# Patient Record
Sex: FEMALE | NOT HISPANIC OR LATINO | URBAN - METROPOLITAN AREA
[De-identification: names, ages, dates, MRNs, and addresses within clinical notes are randomized per-mention and may not be internally consistent; named-entity substitution may affect disease eponyms.]

---

## 2020-02-28 ENCOUNTER — IMPORTED ENCOUNTER (OUTPATIENT)
Dept: URBAN - METROPOLITAN AREA CLINIC 1 | Facility: CLINIC | Age: 55
End: 2020-02-28

## 2020-02-28 PROBLEM — H25.813: Noted: 2020-02-28

## 2020-02-28 PROBLEM — H04.123: Noted: 2020-02-28

## 2020-02-28 PROBLEM — H16.143: Noted: 2020-02-28

## 2020-02-28 PROCEDURE — 92002 INTRM OPH EXAM NEW PATIENT: CPT

## 2020-02-28 PROCEDURE — 92015 DETERMINE REFRACTIVE STATE: CPT

## 2020-02-28 NOTE — PATIENT DISCUSSION
1.  STANLEY w/ PEK OU- Recommend AT's Q2hr OU (sample of Refresh pan given)2. Cataract OU: Observe for now without intervention. The patient was advised to contact us if any change or worsening of visionReturn for an appointment in 1 month 10/K check/ tear lab with Dr. En Allen.

## 2020-08-27 ENCOUNTER — IMPORTED ENCOUNTER (OUTPATIENT)
Dept: URBAN - METROPOLITAN AREA CLINIC 1 | Facility: CLINIC | Age: 55
End: 2020-08-27

## 2020-08-27 PROBLEM — G91.9: Noted: 2020-08-27

## 2020-08-27 PROBLEM — H02.834: Noted: 2020-08-27

## 2020-08-27 PROBLEM — H16.143: Noted: 2020-08-27

## 2020-08-27 PROBLEM — H04.123: Noted: 2020-08-27

## 2020-08-27 PROBLEM — H02.831: Noted: 2020-08-27

## 2020-08-27 PROCEDURE — 92014 COMPRE OPH EXAM EST PT 1/>: CPT

## 2020-08-27 PROCEDURE — 92133 CPTRZD OPH DX IMG PST SGM ON: CPT

## 2020-08-27 PROCEDURE — 83861 MICROFLUID ANALY TEARS: CPT

## 2020-08-27 NOTE — PATIENT DISCUSSION
1.  STANLEY w/ PEK OU -- Likely responsible for visual complaints.  Recommend AT's Q2hr OU (sample of Refresh pan given)

## 2020-08-27 NOTE — PATIENT DISCUSSION
1.  H/o Hydrocephalus w/  Shunts - No Optic Nerve Edema. Fundus essentially normal with exception of moderate cupping of optic nerve which appears physiologic. 2.  COAG Suspect OU (CD 0.75 OU) - secondary to optic nerve cupping. OCT with minimal RNFL thinning. Patient is considered Low Risk will continue to observe. 3.  STANLEY w/ PEK OU - Likely responsible for visual complaints. Recommend AT's Q2hr OU (sample of Refresh pan given). Consider Restasis vs. plugs without improvement4. Dermatochalasis OU UL's  - Follow with no intervention at this time. Return for an appointment in 6 months 10 (consider Restasis vs. plugs) with Dr. Serene Gonsalez.

## 2021-02-16 ENCOUNTER — IMPORTED ENCOUNTER (OUTPATIENT)
Dept: URBAN - METROPOLITAN AREA CLINIC 1 | Facility: CLINIC | Age: 56
End: 2021-02-16

## 2021-02-16 PROCEDURE — 99213 OFFICE O/P EST LOW 20 MIN: CPT

## 2021-02-16 NOTE — PATIENT DISCUSSION
1.  Hydrocephalus w/  Shunts - No Optic Nerve Edema. Intraocular pressure low nomal. Fundus essentially normal with exception of moderate cupping of optic nerve which appears physiologic. 2.  COAG Suspect OU (CD 0.75 OU) - secondary to optic nerve cupping. Patient is considered Low Risk will continue to observe. 3.  STANLEY w/ PEK OU - Likely responsible for visual complaints. Recommend AT's Q2hr OU (sample of Refresh pan given). Consider Restasis vs. plugs without improvement4. Dermatochalasis OU UL's  - Follow with no intervention at this time. DMV Visual Field performed and  DMV Form completed today; see attachmentsReturn for an appointment in 6 months 30/OCT with Dr. Chau Murdock.

## 2022-04-02 ASSESSMENT — VISUAL ACUITY
OD_CC: J5
OD_CC: J1+
OD_CC: J1+
OS_CC: 20/40
OS_CC: 20/40
OD_SC: 20/25
OS_CC: J1+
OS_CC: J5
OS_SC: 20/20
OS_SC: 20/20-1
OD_SC: 20/20
OS_SC: 20/20
OS_CC: J1+
OD_CC: 20/80
OD_CC: 20/80
OD_SC: 20/20-1

## 2022-04-02 ASSESSMENT — TONOMETRY
OD_IOP_MMHG: 13
OD_IOP_MMHG: 13
OS_IOP_MMHG: 13
OS_IOP_MMHG: 13
OD_IOP_MMHG: 13
OS_IOP_MMHG: 13